# Patient Record
(demographics unavailable — no encounter records)

---

## 2025-01-03 NOTE — PHYSICAL EXAM
[de-identified] : Tender A1 pulley with triggering left thumb [de-identified] : PA lateral x-rays of both wrist demonstrate left and right basal joint osteoarthritis

## 2025-01-03 NOTE — HISTORY OF PRESENT ILLNESS
[Left] : left hand dominant [FreeTextEntry1] : Patient presents today for evaluation of possible left trigger thumb which started 2 weeks ago.  She reports that she started taking a new medication called Repatha 140 Mg every 2 weeks and was found out that the medication could cause joint swelling. She states that the symptoms (locking and clicking) started to occur when she started taking this medication.  No previous injections.

## 2025-01-03 NOTE — CONSULT LETTER
[Dear  ___] : Dear  [unfilled], [Consult Letter:] : I had the pleasure of evaluating your patient, [unfilled]. [Please see my note below.] : Please see my note below. [Consult Closing:] : Thank you very much for allowing me to participate in the care of this patient.  If you have any questions, please do not hesitate to contact me. [Sincerely,] : Sincerely, [FreeTextEntry3] : Rodney Kendrick MD Co-Director [facility]